# Patient Record
Sex: MALE | Race: WHITE | NOT HISPANIC OR LATINO | URBAN - METROPOLITAN AREA
[De-identification: names, ages, dates, MRNs, and addresses within clinical notes are randomized per-mention and may not be internally consistent; named-entity substitution may affect disease eponyms.]

---

## 2018-07-30 ENCOUNTER — IMPORTED ENCOUNTER (OUTPATIENT)
Dept: URBAN - METROPOLITAN AREA CLINIC 50 | Facility: CLINIC | Age: 63
End: 2018-07-30

## 2018-08-13 ENCOUNTER — IMPORTED ENCOUNTER (OUTPATIENT)
Dept: URBAN - METROPOLITAN AREA CLINIC 50 | Facility: CLINIC | Age: 63
End: 2018-08-13

## 2018-08-13 NOTE — PATIENT DISCUSSION
"""Continue Artificial tears both eyes two - four times a day. Systane Balance or Refresh Advanced"" ""Continue Cool compresses both eyes as needed. "" ""Continue Zaditor both eyes twice a day. as needed"" ""Continue Erythromycin Ointment .  apply to skin"""

## 2018-10-15 ENCOUNTER — IMPORTED ENCOUNTER (OUTPATIENT)
Dept: URBAN - METROPOLITAN AREA CLINIC 50 | Facility: CLINIC | Age: 63
End: 2018-10-15

## 2021-04-17 ASSESSMENT — VISUAL ACUITY
OS_CC: 20/20-1
OS_CC: 20/20
OD_CC: 20/20-1
OS_CC: 20/20
OD_OTHER: 20/30.
OD_CC: 20/20-2
OS_CC: J1+
OD_CC: J1+
OD_CC: 20/20-
OS_OTHER: 20/25-.

## 2021-04-17 ASSESSMENT — TONOMETRY
OD_IOP_MMHG: 19
OS_IOP_MMHG: 17
OS_IOP_MMHG: 17
OD_IOP_MMHG: 17
OS_IOP_MMHG: 19
OD_IOP_MMHG: 17